# Patient Record
Sex: MALE | Race: WHITE | Employment: FULL TIME | URBAN - METROPOLITAN AREA
[De-identification: names, ages, dates, MRNs, and addresses within clinical notes are randomized per-mention and may not be internally consistent; named-entity substitution may affect disease eponyms.]

---

## 2017-01-03 ENCOUNTER — HOSPITAL ENCOUNTER (OUTPATIENT)
Dept: PHYSICAL THERAPY | Age: 47
Discharge: HOME | End: 2017-01-03
Payer: COMMERCIAL

## 2017-01-03 PROCEDURE — 97110 THERAPEUTIC EXERCISES: CPT

## 2017-01-03 PROCEDURE — 97140 MANUAL THERAPY 1/> REGIONS: CPT

## 2017-01-03 NOTE — PROGRESS NOTES
[facility]rapy Center at [facility]   [address], [address]  [phone]   [phone]    Outpatient PHYSICAL THERAPY: Daily Note and Progress Report  Fall Risk Score: 1 (? 5 = High Risk)          ICD-10: Treatment Diagnosis: M25.562  Pain in L knee  M25.652  Stiffness in L knee  REFERRING PHYSICIAN:  Fadi Vieyra., *  MD Orders: Evaluate and treat,   Return Physician Appointment:    10 January  MEDICAL/REFERRING DIAGNOSIS: Presence of left artificial knee joint [Z96.652]  DATE OF ONSET: 10-  PRIOR LEVEL OF FUNCTION: independent  PRECAUTIONS/ALLERGIES: per chart,   ASSESSMENT:  ?????? ? ? This section established at most recent assessment??????????  Pt had LTKA on 10- and has attended 9 visits for out-pt PT. He has made very steady, nice progress with improving ROM, strength and function with L leg. He reports that he is now sleeping painfree. 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 He has good range of motion, and good functional strength for basic mobility, stairs. Still needs strengthening for higher level,dynamic activities such as carrying heavy loads. He is extremely well motivated. Seems to be reasonable about his limitations when considering return to work. Returns to the doctor next week. PROBLEM LIST (Impairments causing functional limitations):  1. Decreased Strength affecting function  2. Edema affecting function  3. Decreased ADL/Functional Activities  4. Decreased Flexibility/joint mobility  5. Increased Pain affecting function  GOALS: (Goals have been discussed and agreed upon with patient.)  SHORT-TERM FUNCTIONAL GOALS: Time Frame: 2-4 weeks  1. Progress to independent, safe gait at home and community  without assistive device  2. Increase knee AROM to 0-115° for improved gait pattern and ease with sitting  3. Improve strength of knee musculature to allow stable knee in stance, to improve gait  4. Establish independent HEP with minimal cueing to increase knee ROM and strength  DISCHARGE GOALS: Time Frame: 8-12 weeks  1. Increase knee AROM to 0-120 ° to improve gait, sitting and getting in/out of car   2. Increase strength musculature to at least 4/5 for reciprocal stair climbing with rail, and ambulation > 20 minutes  3. Improve score on LEFS by >9 points to allow return to advanced ADL's  4. [place] in advanced HEP with no cueing to maintain/improve gains made in therapy  REHABILITATION POTENTIAL FOR STATED GOALS: [address]:  INTERVENTIONS PLANNED: (Benefits and precautions of physical therapy have been discussed with the patient.)  1. balance exercise  2. home exercise program (HEP)  3. manual therapy  4. neuromuscular re-education/strengthening  5. range of motion: active/assisted/passive  6. therapeutic exercise/strengthening  TREATMENT PLAN EFFECTIVE DATES: 12-1-2016 TO 1-  FREQUENCY/DURATION: Follow patient 2 or 3 times a week for 6 weeks to address above goals, and upon reassessment will adjust frequency and duration as progress indicates. Regarding Will Covarrubias's therapy, I certify that the treatment plan above will be carried out by a therapist or under their direction. Thank you for this referral,  Niranjan Agrawal, PT,      Referring Physician Signature:  Julisa Lugo., *         Date                       SUBJECTIVE:     History of Present Injury/Illness (Reason for Referral): Pt had L TKA on 10-28. He injured the [place] about 16 yrs ago, with PCL, MCL tears and medial meniscus derangement. They tried meniscus replacement but that failed. He has 'limped along' with a bad knee for quite a while, but it became increasingly more painful and limiting. He had LTKA about 4 weeks ago, and has had home Health for about 2 weeks. He had pushed walking for up to about 1/2 mile, but fell on Thanksgiving, resulting in a possible partial quad tear, or at least a bad, deep bruise. Present Symptoms: Pt reports that his knee is feeling good and hips aren't as sore today   Pain Intensity 1: 1   Pain after treatment:  0  Dominant Side: right  Past Medical History: non contributory other than previous L knee injury  Current Medications: per chart, Hydrocodone at night to aid with sleep   Date Last Reviewed: 1/3/2017   No reported changes in meds   Social History/Home Situation: . Lives in 1 [address] house      Work/Activity History: is a  in [address] [place] [address]. Very active and eager to return to work. Was able to dead lift 502# about 3 months prior to surgery! OBJECTIVE:  Outcome Measure: Tool Used: Lower Extremity Functional Scale (LEFS)  Score:  Initial: 58/80 Most Recent: 75/80 (Date: 1-3 )   Interpretation of Score: 20 questions each scored on a 5 point scale with 0 representing \"extreme difficulty or unable to perform\" and 4 representing \"no difficulty\". The lower the score, the greater the functional disability. 80/80 represents no disability. Minimal detectable change is 9 points. Score 80 79-63 62-48 47-32 31-16 15-1 0   Modifier CH CI CJ CK CL CM CN       Observation/Orthostatic Postural Assessment:   :    minimal swelling at the knee. Well healing midline incision. Palpation:     moderate stiffness to soft tissue mobs around the incision. Reduced patellar mobility. ROM:        L knee:  3-125 °               PROM:  12-21:  1- 133 ° in supine. Strength:      L hip flex= 4+, knee extn not tested. Knee flex= 4 to 4+  Hip abd= 3+ to 4-                       Neurological Screen: some expected numbness around the incision  Functional Mobility:       independent, no limp. Low tolerance for activities   Balance:  NT  TREATMENT:    (In addition to Assessment/Re-Assessment sessions the following treatments were rendered)  Therapeutic Exercise: ( 45 min ):  Exercises as outlined below to improve mobility and strength.   Required minimal verbal and manual cues to promote proper body alignment. Progressed resistance, range and repetitions as indicated. Treadmill, at up to 3.5 mph, x 10 min  HS stretching at edge of mat  Hc stretching on slant  Knee flexion machine--35#--2 x 15, focus on eccentrics  Standing hip abd--37.5#--2 x 15  Standing hip flex--37.5#--2 x 15  Standing hip extn-81#--2 x 15  Step ups to 6\" step with 30# at cables--3 x 12  Lateral step overs--8\" step--2 x 12,  Wall ball slides--2 x 12  Rapid step ups, 1 min --leading with each leg--8\"  Standing knee extn--at cables with 40#--2 x 12-  Standing on bosu--for balance, and proprioception--held today  Knee ext machine,20#, 2 x 15 - held today  Step ups to bosu, 2 x 10-- held today    Resisted walking, 40#, 2 x 10 - fwds, bwds, lat-- held today        Manual Therapy ( 15 min ): soft tissue mobs for distal quad and for superior aspect of the scar, to some tight areas and knots. Patellar mobs and patellar tendon soft tissue mobs. Emphasis with passive stretching was for extension. Therapeutic Modalities:                                                                                                                                                                                            HEP: As directed.  ______________________________________________________________________________________________________  Treatment Assessment:  Patient tolerated treatment today without difficulty. He continues to make steady progress, and did not mention bilat hip soreness today. Close to full ROM with knee flexion with very little pain at end rg. Able to get to 0 deg active knee ext now. Given black Tband to use at home for leg press and knee ext ex's (was able to do this well in clinic). To work on hip flexor stretching over end of table to help with knee ext in supine. He is sleeping through the night now and taking only OTC meds prn ( some days none) for pain.  He is hoping to return to work shortly after seeing doctor on Sergio 10. Progression/Medical Necessity:   Patient is expected to demonstrate progress in strength, range of motion, balance and functional technique to increase independence with all LE ADL's. Compliance with Program/Exercises: Compliant with HEP. Reason for Continuation of Services/Other Comments:  Patient continues to require modification of therapeutic interventions to increase complexity of exercises. Patient continues to require skilled intervention due to pain, decreased ROM & strength, limiting LE ADL's. Treatment Duration & Frequency: Follow patient 2 -3 times a week for 6 weeks to address above goals, and upon reassessment will adjust frequency and duration as progress indicates. Recommendations/Intent for next treatment session:   Treatment next visit will focus on advancements to more challenging activities. Continue to work on LLE strengthening, kalyn eccentrics.   Total Treatment Duration:   60 min  Total number of visits:    9    PT Patient Time In/Time Out  Time In: 1100  Time Out: 20 [facility],

## 2017-01-05 ENCOUNTER — HOSPITAL ENCOUNTER (OUTPATIENT)
Dept: PHYSICAL THERAPY | Age: 47
Discharge: HOME | End: 2017-01-05
Payer: COMMERCIAL

## 2017-01-05 PROCEDURE — 97110 THERAPEUTIC EXERCISES: CPT

## 2017-01-05 NOTE — PROGRESS NOTES
Therapy Center at [facility] Therapy   [address], [address]  [phone]   [phone]    Outpatient PHYSICAL THERAPY: Daily Note  Fall Risk Score: 1 (? 5 = High Risk)          ICD-10: Treatment Diagnosis: M25.562  Pain in L knee  M25.652  Stiffness in L knee  REFERRING PHYSICIAN:  Florentin Kearns., *  MD Orders: Evaluate and treat,   Return Physician Appointment:    10 January  MEDICAL/REFERRING DIAGNOSIS: Presence of left artificial knee joint [Z96.652]  DATE OF ONSET: 10-  PRIOR LEVEL OF FUNCTION: independent  PRECAUTIONS/ALLERGIES: per chart,   ASSESSMENT:  ?????? ? ? This section established at most recent assessment??????????  Pt had LTKA on 10- and has attended 9 visits for out-pt PT. He has made very steady, nice progress with improving ROM, strength and function with L leg. He reports that he is now sleeping painfree. 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 He has good range of motion, and good functional strength for basic mobility, stairs. Still needs strengthening for higher level,dynamic activities such as carrying heavy loads. He is extremely well motivated. Seems to be reasonable about his limitations when considering return to work. Returns to the doctor next week. PROBLEM LIST (Impairments causing functional limitations):  1. Decreased Strength affecting function  2. Edema affecting function  3. Decreased ADL/Functional Activities  4. Decreased Flexibility/joint mobility  5. Increased Pain affecting function  GOALS: (Goals have been discussed and agreed upon with patient.)  SHORT-TERM FUNCTIONAL GOALS: Time Frame: 2-4 weeks  1. Progress to independent, safe gait at home and community  without assistive device  2. Increase knee AROM to 0-115° for improved gait pattern and ease with sitting  3. Improve strength of knee musculature to allow stable knee in stance, to improve gait  4.  Establish independent HEP with minimal cueing to increase knee ROM and strength  DISCHARGE GOALS: Time Frame: 8-12 weeks  1. Increase knee AROM to 0-120 ° to improve gait, sitting and getting in/out of car   2. Increase strength musculature to at least 4/5 for reciprocal stair climbing with rail, and ambulation > 20 minutes  3. Improve score on LEFS by >9 points to allow return to advanced ADL's  4. [place] in advanced HEP with no cueing to maintain/improve gains made in therapy  REHABILITATION POTENTIAL FOR STATED GOALS: [address]:  INTERVENTIONS PLANNED: (Benefits and precautions of physical therapy have been discussed with the patient.)  1. balance exercise  2. home exercise program (HEP)  3. manual therapy  4. neuromuscular re-education/strengthening  5. range of motion: active/assisted/passive  6. therapeutic exercise/strengthening  TREATMENT PLAN EFFECTIVE DATES: 12-1-2016 TO 1-  FREQUENCY/DURATION: Follow patient 2 or 3 times a week for 6 weeks to address above goals, and upon reassessment will adjust frequency and duration as progress indicates. Regarding Yajaira Covarrubias's therapy, I certify that the treatment plan above will be carried out by a therapist or under their direction. Thank you for this referral,  Faraz Hayden, PT,      Referring Physician Signature:  Ash Berman, *         Date                       SUBJECTIVE:     History of Present Injury/Illness (Reason for Referral): Pt had L TKA on 10-28. He injured the [place] about 16 yrs ago, with PCL, MCL tears and medial meniscus derangement. They tried meniscus replacement but that failed. He has 'limped along' with a bad knee for quite a while, but it became increasingly more painful and limiting. He had LTKA about 4 weeks ago, and has had home Health for about 2 weeks. He had pushed walking for up to about 1/2 mile, but fell on Thanksgiving, resulting in a possible partial quad tear, or at least a bad, deep bruise.       Present Symptoms: Pt reports that his knee is feeling good and hips aren't as sore today   Pain Intensity 1: 3   Pain after treatment:  0  Dominant Side: right  Past Medical History: non contributory other than previous L knee injury  Current Medications: per chart, Hydrocodone at night to aid with sleep   Date Last Reviewed: 1/5/2017   No reported changes in meds   Social History/Home Situation: . Lives in 1 [address] house      Work/Activity History: is a  in [address] [place] [address]. Very active and eager to return to work. Was able to dead lift 502# about 3 months prior to surgery! OBJECTIVE:  Outcome Measure: Tool Used: Lower Extremity Functional Scale (LEFS)  Score:  Initial: 58/80 Most Recent: 75/80 (Date: 1-3 )   Interpretation of Score: 20 questions each scored on a 5 point scale with 0 representing \"extreme difficulty or unable to perform\" and 4 representing \"no difficulty\". The lower the score, the greater the functional disability. 80/80 represents no disability. Minimal detectable change is 9 points. Score 80 79-63 62-48 47-32 31-16 15-1 0   Modifier CH CI CJ CK CL CM CN       Observation/Orthostatic Postural Assessment:   :    minimal swelling at the knee. Well healing midline incision. Palpation:     moderate stiffness to soft tissue mobs around the incision. Reduced patellar mobility. ROM:        L knee:  3-125 °               PROM:  12-21:  1- 133 ° in supine. Strength:      L hip flex= 4+, knee extn not tested. Knee flex= 4 to 4+  Hip abd= 3+ to 4-                       Neurological Screen: some expected numbness around the incision  Functional Mobility:       independent, no limp. Low tolerance for activities   Balance:  NT  TREATMENT:    (In addition to Assessment/Re-Assessment sessions the following treatments were rendered)  Therapeutic Exercise: ( 60 min ):  Exercises as outlined below to improve mobility and strength.   Required minimal verbal and manual cues to promote proper body alignment. Progressed resistance, range and repetitions as indicated. Treadmill, at up to 3.5 mph, x 10 min  HS stretching at edge of mat  Hc stretching on slant  Knee flexion machine--35#--2 x 15, focus on eccentrics  Standing hip abd--37.5#--2 x 15  Standing hip flex--37.5#--2 x 15  Standing hip extn-81#--2 x 15  Step ups to 6\" step with 30# at cables--3 x 12  Lateral step overs--8\" step--2 x 12,  Wall ball slides--2 x 12  Rapid step ups, 1 min --leading with each leg--8\"  Standing knee extn--at cables with 40#--2 x 12-  Standing on bosu--for balance, and proprioception--held today  Knee ext machine,20#, 2 x 15 - held today  Step ups to bosu, 2 x 10-- held today    Resisted walking, 40#, 2 x 10 - fwds, bwds, lat-- held today        Manual Therapy ( ): soft tissue mobs for distal quad and for superior aspect of the scar, to some tight areas and knots. Patellar mobs and patellar tendon soft tissue mobs. Emphasis with passive stretching was for extension. Therapeutic Modalities:                                                                                                                                                                                            HEP: As directed.  ______________________________________________________________________________________________________  Treatment Assessment:  Patient tolerated treatment today without difficulty. He continues to make steady progress, and did not mention bilat hip soreness today. Close to full ROM with knee flexion with very little pain at end rg. Able to get to 0 deg active knee ext now. Given black Tband to use at home for leg press and knee ext ex's (was able to do this well in clinic). To work on hip flexor stretching over end of table to help with knee ext in supine. He is sleeping through the night now and taking only OTC meds prn ( some days none) for pain.  He is hoping to return to work shortly after seeing doctor on Sergio 10. Progression/Medical Necessity:   Patient is expected to demonstrate progress in strength, range of motion, balance and functional technique to increase independence with all LE ADL's. Compliance with Program/Exercises: Compliant with HEP. Reason for Continuation of Services/Other Comments:  Patient continues to require modification of therapeutic interventions to increase complexity of exercises. Patient continues to require skilled intervention due to pain, decreased ROM & strength, limiting LE ADL's. Treatment Duration & Frequency: Follow patient 2 -3 times a week for 6 weeks to address above goals, and upon reassessment will adjust frequency and duration as progress indicates. Recommendations/Intent for next treatment session:   Treatment next visit will focus on advancements to more challenging activities. Continue to work on LLE strengthening, kalyn eccentrics.   Total Treatment Duration:   60 min  Total number of visits:    10    PT Patient Time In/Time Out  Time In: 1100  Time Out: 20 [facility], PT

## 2017-01-05 NOTE — PROGRESS NOTES
[facility]rapy Center at [facility] Therapy   [address], [address]  [phone]   [phone]    Outpatient PHYSICAL THERAPY: Daily Note  Fall Risk Score: 1 (? 5 = High Risk)          ICD-10: Treatment Diagnosis: M25.562  Pain in L knee  M25.652  Stiffness in L knee  REFERRING PHYSICIAN:  Lord Srinivasan., *  MD Orders: Evaluate and treat,   Return Physician Appointment:    10 January  MEDICAL/REFERRING DIAGNOSIS: Presence of left artificial knee joint [Z96.652]  DATE OF ONSET: 10-  PRIOR LEVEL OF FUNCTION: independent  PRECAUTIONS/ALLERGIES: per chart,   ASSESSMENT:  ?????? ? ? This section established at most recent assessment??????????  Pt had LTKA on 10- and has attended 9 visits for out-pt PT. He has made very steady, nice progress with improving ROM, strength and function with L leg. He reports that he is now sleeping painfree. 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 He has good range of motion, and good functional strength for basic mobility, stairs. Still needs strengthening for higher level,dynamic activities such as carrying heavy loads. He is extremely well motivated. Seems to be reasonable about his limitations when considering return to work. Returns to the doctor next week. PROBLEM LIST (Impairments causing functional limitations):  1. Decreased Strength affecting function  2. Edema affecting function  3. Decreased ADL/Functional Activities  4. Decreased Flexibility/joint mobility  5. Increased Pain affecting function  GOALS: (Goals have been discussed and agreed upon with patient.)  SHORT-TERM FUNCTIONAL GOALS: Time Frame: 2-4 weeks  1. Progress to independent, safe gait at home and community  without assistive device  2. Increase knee AROM to 0-115° for improved gait pattern and ease with sitting  3. Improve strength of knee musculature to allow stable knee in stance, to improve gait  4.  Establish independent HEP with minimal cueing to increase knee ROM and strength  DISCHARGE GOALS: Time Frame: 8-12 weeks  1. Increase knee AROM to 0-120 ° to improve gait, sitting and getting in/out of car   2. Increase strength musculature to at least 4/5 for reciprocal stair climbing with rail, and ambulation > 20 minutes  3. Improve score on LEFS by >9 points to allow return to advanced ADL's  4. Love in advanced HEP with no cueing to maintain/improve gains made in therapy  REHABILITATION POTENTIAL FOR STATED GOALS: [address]:  INTERVENTIONS PLANNED: (Benefits and precautions of physical therapy have been discussed with the patient.)  1. balance exercise  2. home exercise program (HEP)  3. manual therapy  4. neuromuscular re-education/strengthening  5. range of motion: active/assisted/passive  6. therapeutic exercise/strengthening  TREATMENT PLAN EFFECTIVE DATES: 12-1-2016 TO 1-  FREQUENCY/DURATION: Follow patient 2 or 3 times a week for 6 weeks to address above goals, and upon reassessment will adjust frequency and duration as progress indicates. Regarding Marv Covarrubias's therapy, I certify that the treatment plan above will be carried out by a therapist or under their direction. Thank you for this referral,  Jensen Meek, PT,      Referring Physician Signature:  Lui Ortega., *         Date                       SUBJECTIVE:     History of Present Injury/Illness (Reason for Referral): Pt had L TKA on 10-28. He injured the [place] about 16 yrs ago, with PCL, MCL tears and medial meniscus derangement. They tried meniscus replacement but that failed. He has 'limped along' with a bad knee for quite a while, but it became increasingly more painful and limiting. He had LTKA about 4 weeks ago, and has had home Health for about 2 weeks. He had pushed walking for up to about 1/2 mile, but fell on Thanksgiving, resulting in a possible partial quad tear, or at least a bad, deep bruise.       Present Symptoms: Pt reports that his knee is feeling good. He reports lots of discomfort with what feels like post-exercise pain in the hips. But he also said that he cannot remember when his L leg has felt this good. Pain Intensity 1: 3   Pain after treatment:  0  Dominant Side: right  Past Medical History: non contributory other than previous L knee injury  Current Medications: per chart, Hydrocodone at night to aid with sleep   Date Last Reviewed: 1/5/2017   No reported changes in meds   Social History/Home Situation: . Lives in 1 [place] house      Work/Activity History: is a  in [address] [place] [address]. Very active and eager to return to work. Was able to dead lift 502# about 3 months prior to surgery! OBJECTIVE:  Outcome Measure: Tool Used: Lower Extremity Functional Scale (LEFS)  Score:  Initial: 58/80 Most Recent: 75/80 (Date: 1-3 )   Interpretation of Score: 20 questions each scored on a 5 point scale with 0 representing \"extreme difficulty or unable to perform\" and 4 representing \"no difficulty\". The lower the score, the greater the functional disability. 80/80 represents no disability. Minimal detectable change is 9 points. Score 80 79-63 62-48 47-32 31-16 15-1 0   Modifier CH CI CJ CK CL CM CN       Observation/Orthostatic Postural Assessment:   :    minimal swelling at the knee. Well healing midline incision. Palpation:     moderate stiffness to soft tissue mobs around the incision. Reduced patellar mobility. ROM:        L knee:  3-125 °               PROM:  12-21:  1- 133 ° in supine. Strength:      L hip flex= 4+, knee extn not tested. Knee flex= 4 to 4+  Hip abd= 3+ to 4-                       Neurological Screen: some expected numbness around the incision  Functional Mobility:       independent, no limp.   Low tolerance for activities   Balance:  NT  TREATMENT:    (In addition to Assessment/Re-Assessment sessions the following treatments were rendered)  Therapeutic Exercise: ( 60 min ):  Exercises as outlined below to improve mobility and strength. Required minimal verbal and manual cues to promote proper body alignment. Progressed resistance, range and repetitions as indicated. Bike , seat at 4, 8 min, warm up  HS stretching at edge of mat  Hc stretching on slant  Standing hip abd--25#--2 x 20  Standing hip flex--25#--2 x 20  Standing hip extn-75#--2 x 20  Lateral step overs--8\" step--2 x 12,  Wall ball slides--2 x 12  Standing knee extn, stepping up to 4\" step--at cables with 40#--2 x 12-  Knee ext machine,20#, 2 x 15 -for eccentrics  Step ups to chair seat ( ~18\")     Did fine with this with min UE support. Step ups to bosu, 2 x 10-- held today  Resisted walking, 40#, 2 x 10 - fwds, bwds, lat-- held today  Standing on bosu--for balance, and proprioception--held today      Manual Therapy ( ): soft tissue mobs for distal quad and for superior aspect of the scar, to some tight areas and knots. Patellar mobs and patellar tendon soft tissue mobs. Emphasis with passive stretching was for extension. Measured ROM today:  0-135 °    Therapeutic Modalities:                                                                                                                                                                                            HEP: As directed.  ______________________________________________________________________________________________________  Treatment Assessment:  Patient tolerated treatment today without difficulty. He continues to make steady progress, and by the time he left, he said his hips felt good. Full ROM with knee flexion with very little pain at end rg. Able to get to 0 deg active knee ext now. To continue work on hip flexor stretching over end of table to help with knee ext in supine. He is sleeping through the night now and taking only OTC meds prn ( some days none) for pain.  He is hoping to return to work shortly after seeing doctor on Sergio 10. Progression/Medical Necessity:   Patient is expected to demonstrate progress in strength, range of motion, balance and functional technique to increase independence with all LE ADL's. Compliance with Program/Exercises: Compliant with HEP. Reason for Continuation of Services/Other Comments:  Patient continues to require modification of therapeutic interventions to increase complexity of exercises. Patient continues to require skilled intervention due to pain, decreased ROM & strength, limiting LE ADL's. Treatment Duration & Frequency: Follow patient 2 -3 times a week for 6 weeks to address above goals, and upon reassessment will adjust frequency and duration as progress indicates. Recommendations/Intent for next treatment session:   Treatment next visit will focus on advancements to more challenging activities. Continue to work on LLE strengthening, kalyn eccentrics.   Total Treatment Duration:   60 min  Total number of visits:    10    PT Patient Time In/Time Out  Time In: 1100  Time Out: 20 [facility],

## 2017-01-10 ENCOUNTER — HOSPITAL ENCOUNTER (OUTPATIENT)
Dept: PHYSICAL THERAPY | Age: 47
Discharge: HOME | End: 2017-01-10
Payer: COMMERCIAL

## 2017-01-10 PROCEDURE — 97110 THERAPEUTIC EXERCISES: CPT

## 2017-01-10 NOTE — PROGRESS NOTES
Therapy Center at [facility] Therapy   [address], [address]  [phone]   [phone]    Outpatient PHYSICAL THERAPY: Daily Note  Fall Risk Score: 1 (? 5 = High Risk)          ICD-10: Treatment Diagnosis: M25.562  Pain in L knee  M25.652  Stiffness in L knee  REFERRING PHYSICIAN:  Yolanda Godinez, *  MD Orders: Evaluate and treat,   Return Physician Appointment:    10 January  MEDICAL/REFERRING DIAGNOSIS: Presence of left artificial knee joint [Z96.652]  DATE OF ONSET: 10-  PRIOR LEVEL OF FUNCTION: independent  PRECAUTIONS/ALLERGIES: per chart,   ASSESSMENT:  ?????? ? ? This section established at most recent assessment??????????  Pt had LTKA on 10- and has attended 9 visits for out-pt PT. He has made very steady, nice progress with improving ROM, strength and function with L leg. He reports that he is now sleeping painfree. 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 He has good range of motion, and good functional strength for basic mobility, stairs. Still needs strengthening for higher level,dynamic activities such as carrying heavy loads. He is extremely well motivated. Seems to be reasonable about his limitations when considering return to work. Returns to the doctor next week. PROBLEM LIST (Impairments causing functional limitations):  1. Decreased Strength affecting function  2. Edema affecting function  3. Decreased ADL/Functional Activities  4. Decreased Flexibility/joint mobility  5. Increased Pain affecting function  GOALS: (Goals have been discussed and agreed upon with patient.)  SHORT-TERM FUNCTIONAL GOALS: Time Frame: 2-4 weeks  1. Progress to independent, safe gait at home and community  without assistive device  2. Increase knee AROM to 0-115° for improved gait pattern and ease with sitting  3. Improve strength of knee musculature to allow stable knee in stance, to improve gait  4.  Establish independent HEP with minimal cueing to increase knee ROM and strength  DISCHARGE GOALS: Time Frame: 8-12 weeks  1. Increase knee AROM to 0-120 ° to improve gait, sitting and getting in/out of car   2. Increase strength musculature to at least 4/5 for reciprocal stair climbing with rail, and ambulation > 20 minutes  3. Improve score on LEFS by >9 points to allow return to advanced ADL's  4. [place] in advanced HEP with no cueing to maintain/improve gains made in therapy  REHABILITATION POTENTIAL FOR STATED GOALS: [address]:  INTERVENTIONS PLANNED: (Benefits and precautions of physical therapy have been discussed with the patient.)  1. balance exercise  2. home exercise program (HEP)  3. manual therapy  4. neuromuscular re-education/strengthening  5. range of motion: active/assisted/passive  6. therapeutic exercise/strengthening  TREATMENT PLAN EFFECTIVE DATES: 12-1-2016 TO 1-  FREQUENCY/DURATION: Follow patient 2 or 3 times a week for 6 weeks to address above goals, and upon reassessment will adjust frequency and duration as progress indicates. Regarding Alize Covarrubias's therapy, I certify that the treatment plan above will be carried out by a therapist or under their direction. Thank you for this referral,  April Tabares PT,      Referring Physician Signature:  John Shea., *         Date                       SUBJECTIVE:     History of Present Injury/Illness (Reason for Referral): Pt had L TKA on 10-28. He injured the [place] about 16 yrs ago, with PCL, MCL tears and medial meniscus derangement. They tried meniscus replacement but that failed. He has 'limped along' with a bad knee for quite a while, but it became increasingly more painful and limiting. He had LTKA about 4 weeks ago, and has had home Health for about 2 weeks. He had pushed walking for up to about 1/2 mile, but fell on Thanksgiving, resulting in a possible partial quad tear, or at least a bad, deep bruise.       Present Symptoms: Pt reports that his knee is feeling good. He reports lots of discomfort with what feels like post-exercise pain in the hips. But he also said that he cannot remember when his L leg has felt this good. Pain Intensity 1: 0   Pain after treatment:  0  Dominant Side: right  Past Medical History: non contributory other than previous L knee injury  Current Medications: per chart, Hydrocodone at night to aid with sleep   Date Last Reviewed: 1/10/2017   No reported changes in meds   Social History/Home Situation: . Lives in 1 [place] house      Work/Activity History: is a  in [address] [place] [address]. Very active and eager to return to work. Was able to dead lift 502# about 3 months prior to surgery! OBJECTIVE:  Outcome Measure: Tool Used: Lower Extremity Functional Scale (LEFS)  Score:  Initial: 58/80 Most Recent: 75/80 (Date: 1-3 )   Interpretation of Score: 20 questions each scored on a 5 point scale with 0 representing \"extreme difficulty or unable to perform\" and 4 representing \"no difficulty\". The lower the score, the greater the functional disability. 80/80 represents no disability. Minimal detectable change is 9 points. Score 80 79-63 62-48 47-32 31-16 15-1 0   Modifier CH CI CJ CK CL CM CN       Observation/Orthostatic Postural Assessment:   :    minimal swelling at the knee. Well healing midline incision. Palpation:     moderate stiffness to soft tissue mobs around the incision. Reduced patellar mobility. ROM:        L knee:  3-125 °               PROM:  12-21:  1- 133 ° in supine. 1-5:  0-135 ° , with ease      Strength:      L hip flex= 4+, knee extn not tested. Knee flex= 4 to 4+  Hip abd= 3+ to 4-                       Neurological Screen: some expected numbness around the incision  Functional Mobility:       independent, no limp.   Low tolerance for activities   Balance:  NT  TREATMENT:    (In addition to Assessment/Re-Assessment sessions the following treatments were rendered)  Therapeutic Exercise: ( 45 min ):  Exercises as outlined below to improve mobility and strength. Required minimal verbal and manual cues to promote proper body alignment. Progressed resistance, range and repetitions as indicated. Bike , seat at 3, 5 min, warm up  HS stretching at edge of mat  Hc stretching on slant  Standing hip abd--31#--2 x 20  Standing hip flex--31#--2 x 20  Eccentrics for knee extn--at machine, 20#--3 x 12  Resisted lunges at cables--45#--  Had more difficulty when L leg was back  Resisted back lunge, starting from 6\" step,  35# at cables  Lateral step overs--8\" step--2 x 30  Wall ball slides--2 x 12  Standing knee extn, stepping up to 4\" step--at cables with 40#--2 x 12-  Knee ext machine,20#, 2 x 15 -for eccentrics  Elliptical--8 min, for coordination with L knee    Step ups to bosu, 2 x 10-- held today  Resisted walking, 40#, 2 x 10 - fwds, bwds, lat-- held today  Standing on bosu--for balance, and proprioception--held today      Manual Therapy ( ): soft tissue mobs for distal quad and for superior aspect of the scar, to some tight areas and knots. Patellar mobs and patellar tendon soft tissue mobs. Emphasis with passive stretching was for extension. Measured ROM today:  0-135 °    Therapeutic Modalities:   Cold packs to knee following exercises                                                                                                                                                                                          HEP: As directed.  ______________________________________________________________________________________________________  Treatment Assessment:  Patient tolerated treatment today without difficulty. He continues to make steady progress, and by the time he left, he said his hips felt good. Full ROM with knee flexion. Able to get to 0 deg active knee ext now and flexion to 135 ° +.   He sees Dr Sabrina Mar today; told me that he feels like he is ready to return to work. Layla Riser He is hoping to return to work next week. Progression/Medical Necessity:   Patient is expected to demonstrate progress in strength, range of motion, balance and functional technique to increase independence with all LE ADL's. Compliance with Program/Exercises: Compliant with HEP. Reason for Continuation of Services/Other Comments:  Patient continues to require modification of therapeutic interventions to increase complexity of exercises. Patient continues to require skilled intervention due to pain, decreased ROM & strength, limiting LE ADL's. Treatment Duration & Frequency: Follow patient 2 -3 times a week for 6 weeks to address above goals, and upon reassessment will adjust frequency and duration as progress indicates. Recommendations/Intent for next treatment session:   Treatment next visit will focus on advancements to more challenging activities. Continue to work on LLE strengthening, kalyn eccentrics. Will ensure pt is comfortable with exercise program going forward, after DC.       Total Treatment Duration:   45 min  Total number of visits:    11    PT Patient Time In/Time Out  Time In: 0830  Time Out: 601 [facility], PT

## 2017-01-12 ENCOUNTER — HOSPITAL ENCOUNTER (OUTPATIENT)
Dept: PHYSICAL THERAPY | Age: 47
Discharge: HOME | End: 2017-01-12
Payer: COMMERCIAL

## 2017-01-12 PROCEDURE — 97110 THERAPEUTIC EXERCISES: CPT

## 2017-01-12 NOTE — PROGRESS NOTES
[facility] [address] [facility]   [address], [address]  [phone]   [phone]    Outpatient PHYSICAL THERAPY: Daily Note and Discharge  Fall Risk Score: 1 (? 5 = High Risk)          ICD-10: Treatment Diagnosis: M25.562  Pain in L knee  M25.652  Stiffness in L knee  REFERRING PHYSICIAN:  Callie Mcdonnell, *  MD Orders: Evaluate and treat,   Return Physician Appointment:    10 January  MEDICAL/REFERRING DIAGNOSIS: Presence of left artificial knee joint [Z96.652]  DATE OF ONSET: 10-  PRIOR LEVEL OF FUNCTION: independent  PRECAUTIONS/ALLERGIES: per chart,   ASSESSMENT:  ?????? ? ? This section established at most recent assessment??????????  Pt had LTKA on 10- and has attended 9 visits for out-pt PT. He made very steady, nice progress with improving ROM, strength and function with L leg. He is now sleeping painfree. 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 He has good range of motion, and good functional strength for basic mobility, stairs, even ladders. Still needs strengthening for higher level, dynamic activities such as carrying heavy loads. He reported that now his L knee feels better most of the time than his R one does. He remained  extremely well motivated. Seems to be reasonable about his limitations when considering return to work. The doctor released him to return to work, stating that pt had exceeded his expectations. Pt is DC'd from PT. PROBLEM LIST (Impairments causing functional limitations):  1. Decreased Strength affecting function  2. Edema affecting function  3. Decreased ADL/Functional Activities  4. Decreased Flexibility/joint mobility  5. Increased Pain affecting function  GOALS: (Goals have been discussed and agreed upon with patient.)  SHORT-TERM FUNCTIONAL GOALS: Time Frame: 2-4 weeks  1. Progress to independent, safe gait at home and community  without assistive device- Goal met  2. Increase knee AROM to 0-115° for improved gait pattern and ease with sitting - Goal met  3. Improve strength of knee musculature to allow stable knee in stance, to improve gait- Goal met  4. Establish independent HEP with minimal cueing to increase knee ROM and strength- Goal met  DISCHARGE GOALS: Time Frame: 8-12 weeks  1. Increase knee AROM to 0-120 ° to improve gait, sitting and getting in/out of car- Goal met   2. Increase strength musculature to at least 4/5 for reciprocal stair climbing with rail, and ambulation > 20 minutes- Goal met  3. Improve score on LEFS by >9 points to allow return to advanced ADL's- Goal met  4. [place] in advanced HEP with no cueing to maintain/improve gains made in therapy- Goal met  REHABILITATION POTENTIAL FOR STATED GOALS: [address]:    Pt is DC'd from PT today. Thank you for this referral,  Landy Mclean PT  Referring Physician Signature:  Pam Vu, *         Date                       SUBJECTIVE:     History of Present Injury/Illness (Reason for Referral): Pt had L TKA on 10-28. He injured the [place] about 16 yrs ago, with PCL, MCL tears and medial meniscus derangement. They tried meniscus replacement but that failed. He has 'limped along' with a bad knee for quite a while, but it became increasingly more painful and limiting. He had LTKA about 4 weeks ago, and has had home Health for about 2 weeks. He had pushed walking for up to about 1/2 mile, but fell on Thanksgiving, resulting in a possible partial quad tear, or at least a bad, deep bruise. Present Symptoms: Pt reports that his knee feels good and he was able to go up a ladder with little help.     Pain Intensity 1: 0   Pain after treatment:  0  Dominant Side: right  Past Medical History: non contributory other than previous L knee injury  Current Medications: per chart, Hydrocodone at night to aid with sleep   Date Last Reviewed: 1/12/2017   No reported changes in meds   Social History/Home Situation: . Lives in [address]      Work/Activity History: is a  in [address] [place] [address]. Very active and eager to return to work. Was able to dead lift 502# about 3 months prior to surgery! OBJECTIVE:  Outcome Measure: Tool Used: Lower Extremity Functional Scale (LEFS)  Score:  Initial: 58/80 Most Recent: 77/80 (Date: 1-12 )   Interpretation of Score: 20 questions each scored on a 5 point scale with 0 representing \"extreme difficulty or unable to perform\" and 4 representing \"no difficulty\". The lower the score, the greater the functional disability. 80/80 represents no disability. Minimal detectable change is 9 points. Score 80 79-63 62-48 47-32 31-16 15-1 0   Modifier CH CI CJ CK CL CM CN       Observation/Orthostatic Postural Assessment:   :    minimal swelling at the knee. Well healing midline incision. Palpation:     moderate stiffness to soft tissue mobs around the incision. Reduced patellar mobility. ROM:        L knee:  3-125 °               PROM:  12-21:  1- 133 ° in supine. 1-5:  0-135 ° , with ease      Strength:      L hip flex= 4+, knee extn not tested. Knee flex= 4 to 4+  Hip abd= 3+ to 4-                       Neurological Screen: some expected numbness around the incision  Functional Mobility:       independent, no limp. Low tolerance for activities   Balance:  NT  TREATMENT:    (In addition to Assessment/Re-Assessment sessions the following treatments were rendered)  Therapeutic Exercise: ( 60min ):  Exercises as outlined below to improve mobility and strength. Required minimal verbal and manual cues to promote proper body alignment. Progressed resistance, range and repetitions as indicated.     Bike , seat at 3, 5 min, warm up  HS stretching at edge of mat  Hc stretching on slant  Standing hip abd--31#--2 x 20  Standing hip flex--31#--2 x 20  Eccentrics for knee extn--at machine, 20#--3 x 12  Resisted lunges at cables--45#--  Had more difficulty when L leg was back  Resisted back lunge, starting from 6\" step,  35# at cables  Lateral step overs--8\" step--2 x 30  Wall ball slides--2 x 12  Standing knee extn, stepping up to 4\" step--at cables with 40#--2 x 12-  Knee ext machine,20#, 2 x 15 -for eccentrics  Elliptical--8 min, for coordination with L knee  Resisted walking, 40#, 2 x 10 - fwds, bwds, lat--       Manual Therapy ( ): soft tissue mobs for distal quad and for superior aspect of the scar, to some tight areas and knots. Patellar mobs and patellar tendon soft tissue mobs. Emphasis with passive stretching was for extension. Measured ROM today:  0-135 °    Therapeutic Modalities:                                                                                                                                                                                            HEP: As directed.  ______________________________________________________________________________________________________    Treatment Assessment:  Patient tolerated treatment today without difficulty. Patient saw the doctor yesterday and was given the release to return to work on Monday. And after consulting with the acting PT and patient he feels ready to become independent with all exercises. Patient also indicated that he would like to return to the gym program in the next couple of of weeks. Pt is DC'd from PT today.       Total Treatment Duration: 60 min  Total number of visits:    12    PT Patient Time In/Time Out  Time In: 0800  Time Out: 0900    KALI Brandt, PT

## 2017-01-16 ENCOUNTER — APPOINTMENT (OUTPATIENT)
Dept: PHYSICAL THERAPY | Age: 47
End: 2017-01-16
Payer: COMMERCIAL

## 2017-01-18 ENCOUNTER — APPOINTMENT (OUTPATIENT)
Dept: PHYSICAL THERAPY | Age: 47
End: 2017-01-18
Payer: COMMERCIAL

## 2017-01-23 ENCOUNTER — APPOINTMENT (OUTPATIENT)
Dept: PHYSICAL THERAPY | Age: 47
End: 2017-01-23
Payer: COMMERCIAL

## 2017-01-25 ENCOUNTER — APPOINTMENT (OUTPATIENT)
Dept: PHYSICAL THERAPY | Age: 47
End: 2017-01-25
Payer: COMMERCIAL

## 2022-02-03 ENCOUNTER — APPOINTMENT (RX ONLY)
Dept: URBAN - METROPOLITAN AREA CLINIC 349 | Facility: CLINIC | Age: 52
Setting detail: DERMATOLOGY
End: 2022-02-03

## 2022-02-03 DIAGNOSIS — L57.0 ACTINIC KERATOSIS: ICD-10-CM

## 2022-02-03 DIAGNOSIS — D22 MELANOCYTIC NEVI: ICD-10-CM

## 2022-02-03 DIAGNOSIS — L82.1 OTHER SEBORRHEIC KERATOSIS: ICD-10-CM

## 2022-02-03 DIAGNOSIS — Z71.89 OTHER SPECIFIED COUNSELING: ICD-10-CM

## 2022-02-03 DIAGNOSIS — D18.0 HEMANGIOMA: ICD-10-CM

## 2022-02-03 DIAGNOSIS — L91.0 HYPERTROPHIC SCAR: ICD-10-CM

## 2022-02-03 DIAGNOSIS — L82.0 INFLAMED SEBORRHEIC KERATOSIS: ICD-10-CM

## 2022-02-03 PROBLEM — D18.01 HEMANGIOMA OF SKIN AND SUBCUTANEOUS TISSUE: Status: ACTIVE | Noted: 2022-02-03

## 2022-02-03 PROBLEM — D22.5 MELANOCYTIC NEVI OF TRUNK: Status: ACTIVE | Noted: 2022-02-03

## 2022-02-03 PROBLEM — D22.62 MELANOCYTIC NEVI OF LEFT UPPER LIMB, INCLUDING SHOULDER: Status: ACTIVE | Noted: 2022-02-03

## 2022-02-03 PROCEDURE — ? EDUCATIONAL RESOURCES PROVIDED

## 2022-02-03 PROCEDURE — ? COUNSELING

## 2022-02-03 PROCEDURE — ? PRESCRIPTION MEDICATION MANAGEMENT

## 2022-02-03 PROCEDURE — ? LIQUID NITROGEN

## 2022-02-03 PROCEDURE — 17110 DESTRUCTION B9 LES UP TO 14: CPT

## 2022-02-03 PROCEDURE — ? PRESCRIPTION

## 2022-02-03 PROCEDURE — 99203 OFFICE O/P NEW LOW 30 MIN: CPT | Mod: 25

## 2022-02-03 PROCEDURE — 17000 DESTRUCT PREMALG LESION: CPT | Mod: 59

## 2022-02-03 PROCEDURE — ? OTHER

## 2022-02-03 RX ORDER — FLUTICASONE PROPIONATE 0.05 MG/G
OINTMENT TOPICAL
Qty: 60 | Refills: 2 | Status: ERX | COMMUNITY
Start: 2022-02-03

## 2022-02-03 RX ADMIN — FLUTICASONE PROPIONATE: 0.05 OINTMENT TOPICAL at 00:00

## 2022-02-03 ASSESSMENT — LOCATION SIMPLE DESCRIPTION DERM
LOCATION SIMPLE: RIGHT FOREARM
LOCATION SIMPLE: LEFT FOREARM
LOCATION SIMPLE: LEFT UPPER ARM
LOCATION SIMPLE: LEFT LOWER BACK
LOCATION SIMPLE: LEFT UPPER BACK
LOCATION SIMPLE: RIGHT SHOULDER
LOCATION SIMPLE: CHEST
LOCATION SIMPLE: LEFT CHEEK
LOCATION SIMPLE: RIGHT UPPER BACK
LOCATION SIMPLE: ABDOMEN

## 2022-02-03 ASSESSMENT — LOCATION ZONE DERM
LOCATION ZONE: FACE
LOCATION ZONE: TRUNK
LOCATION ZONE: ARM

## 2022-02-03 ASSESSMENT — LOCATION DETAILED DESCRIPTION DERM
LOCATION DETAILED: RIGHT INFERIOR MEDIAL UPPER BACK
LOCATION DETAILED: RIGHT ANTERIOR SHOULDER
LOCATION DETAILED: RIGHT VENTRAL LATERAL PROXIMAL FOREARM
LOCATION DETAILED: LEFT MID-UPPER BACK
LOCATION DETAILED: RIGHT LATERAL ABDOMEN
LOCATION DETAILED: RIGHT SUPERIOR MEDIAL UPPER BACK
LOCATION DETAILED: RIGHT LATERAL UPPER BACK
LOCATION DETAILED: LEFT CENTRAL MALAR CHEEK
LOCATION DETAILED: STERNUM
LOCATION DETAILED: LEFT VENTRAL PROXIMAL FOREARM
LOCATION DETAILED: LEFT LATERAL MALAR CHEEK
LOCATION DETAILED: LEFT PROXIMAL POSTERIOR UPPER ARM
LOCATION DETAILED: PERIUMBILICAL SKIN
LOCATION DETAILED: LEFT SUPERIOR MEDIAL MIDBACK
LOCATION DETAILED: EPIGASTRIC SKIN

## 2022-02-03 NOTE — HPI: SKIN LESION
How Severe Is Your Skin Lesion?: mild
Is This A New Presentation, Or A Follow-Up?: Skin Lesion
Additional History: Pt is here for a lesion on his back. He states it feels scaly and his wife thinks it has gotten bigger. Pt denies family or personal history of melanoma.

## 2022-02-03 NOTE — PROCEDURE: PRESCRIPTION MEDICATION MANAGEMENT
Initiate Treatment: Fluticasone ointment apply to affected areas bid x2 weeks for itching flares
Render In Strict Bullet Format?: No
Detail Level: Zone

## 2022-02-03 NOTE — PROCEDURE: OTHER
Note Text (......Xxx Chief Complaint.): This diagnosis correlates with the
Detail Level: Zone
Other (Free Text): Pt complains of itching. Counseled on risks and side effects of topical steroids. [place] kenalog not a good option because lesions not raised
Render Risk Assessment In Note?: yes

## 2022-02-03 NOTE — PROCEDURE: LIQUID NITROGEN
Consent: The patient's consent was obtained including but not limited to risks of crusting, scabbing, blistering, scarring, darker or lighter pigmentary change, recurrence, incomplete removal and infection.
Duration Of Freeze Thaw-Cycle (Seconds): 3
Show Spray Paint Technique Variable?: Yes
Medical Necessity Clause: This procedure was medically necessary because the lesions that were treated were:
Post-Care Instructions: I reviewed with the patient in detail post-care instructions. Patient is to wear sunprotection, and avoid picking at any of the treated lesions. Pt may apply Vaseline to crusted or scabbing areas.
Add 52 Modifier (Optional): no
Spray Paint Text: The liquid nitrogen was applied to the skin utilizing a spray paint frosting technique.
Detail Level: Detailed
Medical Necessity Information: It is in your best interest to select a reason for this procedure from the list below. All of these items fulfill various CMS LCD requirements except the new and changing color options.
Number Of Freeze-Thaw Cycles: 1 freeze-thaw cycle

## 2022-02-14 ENCOUNTER — APPOINTMENT (RX ONLY)
Dept: URBAN - METROPOLITAN AREA CLINIC 329 | Facility: CLINIC | Age: 52
Setting detail: DERMATOLOGY
End: 2022-02-14

## 2022-02-14 DIAGNOSIS — L91.8 OTHER HYPERTROPHIC DISORDERS OF THE SKIN: ICD-10-CM

## 2022-02-14 DIAGNOSIS — L82.0 INFLAMED SEBORRHEIC KERATOSIS: ICD-10-CM | Status: RESOLVING

## 2022-02-14 DIAGNOSIS — L82.1 OTHER SEBORRHEIC KERATOSIS: ICD-10-CM

## 2022-02-14 DIAGNOSIS — L738 OTHER SPECIFIED DISEASES OF HAIR AND HAIR FOLLICLES: ICD-10-CM

## 2022-02-14 DIAGNOSIS — L73.9 FOLLICULAR DISORDER, UNSPECIFIED: ICD-10-CM

## 2022-02-14 DIAGNOSIS — L72.0 EPIDERMAL CYST: ICD-10-CM

## 2022-02-14 DIAGNOSIS — L663 OTHER SPECIFIED DISEASES OF HAIR AND HAIR FOLLICLES: ICD-10-CM

## 2022-02-14 PROBLEM — L02.821 FURUNCLE OF HEAD [ANY PART, EXCEPT FACE]: Status: ACTIVE | Noted: 2022-02-14

## 2022-02-14 PROCEDURE — ? BENIGN DESTRUCTION COSMETIC

## 2022-02-14 PROCEDURE — ? COUNSELING

## 2022-02-14 PROCEDURE — 99212 OFFICE O/P EST SF 10 MIN: CPT

## 2022-02-14 PROCEDURE — ? OTHER

## 2022-02-14 PROCEDURE — ? DEFER

## 2022-02-14 ASSESSMENT — LOCATION DETAILED DESCRIPTION DERM
LOCATION DETAILED: LEFT MID-UPPER BACK
LOCATION DETAILED: RIGHT INFRAMAMMARY CREASE
LOCATION DETAILED: RIGHT LATERAL TRAPEZIAL NECK
LOCATION DETAILED: RIGHT AXILLARY VAULT
LOCATION DETAILED: LEFT CLAVICULAR NECK
LOCATION DETAILED: LEFT RADIAL DORSAL HAND
LOCATION DETAILED: LEFT POSTERIOR AXILLA
LOCATION DETAILED: RIGHT RIB CAGE
LOCATION DETAILED: LEFT RIB CAGE
LOCATION DETAILED: MID-OCCIPITAL SCALP
LOCATION DETAILED: LEFT POSTERIOR SHOULDER
LOCATION DETAILED: GENITALIA
LOCATION DETAILED: RIGHT CLAVICULAR NECK
LOCATION DETAILED: RIGHT POSTERIOR AXILLA
LOCATION DETAILED: LEFT AXILLARY VAULT
LOCATION DETAILED: RIGHT ANTERIOR MEDIAL PROXIMAL UPPER ARM

## 2022-02-14 ASSESSMENT — LOCATION ZONE DERM
LOCATION ZONE: AXILLAE
LOCATION ZONE: TRUNK
LOCATION ZONE: HAND
LOCATION ZONE: NECK
LOCATION ZONE: GENITALIA
LOCATION ZONE: ARM
LOCATION ZONE: SCALP

## 2022-02-14 ASSESSMENT — LOCATION SIMPLE DESCRIPTION DERM
LOCATION SIMPLE: LEFT UPPER BACK
LOCATION SIMPLE: RIGHT POSTERIOR AXILLA
LOCATION SIMPLE: RIGHT ANTERIOR NECK
LOCATION SIMPLE: RIGHT UPPER ARM
LOCATION SIMPLE: LEFT ANTERIOR NECK
LOCATION SIMPLE: LEFT SHOULDER
LOCATION SIMPLE: POSTERIOR SCALP
LOCATION SIMPLE: LEFT AXILLARY VAULT
LOCATION SIMPLE: LEFT POSTERIOR AXILLA
LOCATION SIMPLE: POSTERIOR NECK
LOCATION SIMPLE: LEFT HAND
LOCATION SIMPLE: RIGHT AXILLARY VAULT
LOCATION SIMPLE: ABDOMEN
LOCATION SIMPLE: GENITALIA

## 2022-02-14 NOTE — HPI: CYST
How Severe Is Your Cyst?: mild
Is This A New Presentation, Or A Follow-Up?: Cyst
Additional History: Patient states his wife often squeezes the spot. He would just like evaluated

## 2022-02-14 NOTE — PROCEDURE: OTHER
Note Text (......Xxx Chief Complaint.): This diagnosis correlates with the
Detail Level: Zone
Other (Free Text): Well healed post LN2
Render Risk Assessment In Note?: no
Other (Free Text): Declines treatment today

## 2022-02-14 NOTE — PROCEDURE: BENIGN DESTRUCTION COSMETIC
Price (Use Numbers Only, No Special Characters Or $): 100
Post-Care Instructions: I reviewed with the patient in detail post-care instructions. Patient is to wear sunprotection, and avoid picking at any of the treated lesions. Pt may apply Vaseline to crusted or scabbing areas.
Consent: The patient's consent was obtained including but not limited to risks of crusting, scabbing, blistering, scarring, darker or lighter pigmentary change, recurrence, incomplete removal and infection.
Anesthesia Type: 3% Mepivacaine HCl
Detail Level: Detailed
Anesthesia Volume In Cc: 0.5

## 2022-02-14 NOTE — HPI: SKIN LESION (SKIN TAGS)
How Severe Are They?: mild
Is This A New Presentation, Or A Follow-Up?: Skin Lesions
Additional History: Patient states he would like spots treated.

## 2024-04-19 ENCOUNTER — TELEPHONE (OUTPATIENT)
Dept: ORTHOPEDIC SURGERY | Age: 54
End: 2024-04-19